# Patient Record
Sex: FEMALE | Race: WHITE | NOT HISPANIC OR LATINO | URBAN - METROPOLITAN AREA
[De-identification: names, ages, dates, MRNs, and addresses within clinical notes are randomized per-mention and may not be internally consistent; named-entity substitution may affect disease eponyms.]

---

## 2017-01-03 ENCOUNTER — AMBULATORY - HEALTHEAST (OUTPATIENT)
Dept: CARDIOLOGY | Facility: CLINIC | Age: 62
End: 2017-01-03

## 2017-01-03 DIAGNOSIS — I10 ESSENTIAL HYPERTENSION: ICD-10-CM

## 2017-01-03 DIAGNOSIS — I49.1 PREMATURE ATRIAL CONTRACTION: ICD-10-CM

## 2017-01-19 ENCOUNTER — OFFICE VISIT - HEALTHEAST (OUTPATIENT)
Dept: CARDIOLOGY | Facility: CLINIC | Age: 62
End: 2017-01-19

## 2017-01-19 DIAGNOSIS — G47.33 OSA ON CPAP: ICD-10-CM

## 2017-01-19 DIAGNOSIS — I10 ESSENTIAL HYPERTENSION WITH GOAL BLOOD PRESSURE LESS THAN 140/90: ICD-10-CM

## 2017-01-19 DIAGNOSIS — I25.118 CORONARY ARTERY DISEASE INVOLVING NATIVE CORONARY ARTERY OF NATIVE HEART WITH OTHER FORM OF ANGINA PECTORIS (H): ICD-10-CM

## 2017-01-19 DIAGNOSIS — R42 DIZZY SPELLS: ICD-10-CM

## 2017-01-19 DIAGNOSIS — R06.09 EXERTIONAL DYSPNEA: ICD-10-CM

## 2017-01-19 ASSESSMENT — MIFFLIN-ST. JEOR: SCORE: 1673.27

## 2017-01-31 ENCOUNTER — HOSPITAL ENCOUNTER (OUTPATIENT)
Dept: NUCLEAR MEDICINE | Facility: HOSPITAL | Age: 62
Discharge: HOME | End: 2017-01-31
Attending: INTERNAL MEDICINE

## 2017-01-31 ENCOUNTER — HOSPITAL ENCOUNTER (OUTPATIENT)
Dept: CARDIOLOGY | Facility: HOSPITAL | Age: 62
Discharge: HOME | End: 2017-01-31
Attending: INTERNAL MEDICINE

## 2017-01-31 DIAGNOSIS — G47.33 OSA ON CPAP: ICD-10-CM

## 2017-01-31 DIAGNOSIS — R42 DIZZY SPELLS: ICD-10-CM

## 2017-01-31 DIAGNOSIS — R06.09 EXERTIONAL DYSPNEA: ICD-10-CM

## 2017-01-31 DIAGNOSIS — I10 ESSENTIAL HYPERTENSION WITH GOAL BLOOD PRESSURE LESS THAN 140/90: ICD-10-CM

## 2017-01-31 DIAGNOSIS — I25.118 CORONARY ARTERY DISEASE INVOLVING NATIVE CORONARY ARTERY OF NATIVE HEART WITH OTHER FORM OF ANGINA PECTORIS (H): ICD-10-CM

## 2017-01-31 DIAGNOSIS — R06.09 OTHER FORMS OF DYSPNEA: ICD-10-CM

## 2017-01-31 LAB
NUC STRESS EJECTION FRACTION: 61 %
STRESS ECHO BASELINE BP: NORMAL M/S
STRESS ECHO BASELINE HR: 68 BPM
STRESS ECHO LAST STRESS BP: NORMAL M/S
STRESS ECHO LAST STRESS HR: 96 BPM

## 2017-01-31 ASSESSMENT — MIFFLIN-ST. JEOR: SCORE: 1673.27

## 2017-02-01 LAB
AORTIC ROOT: 3.1 CM
BSA FOR ECHO PROCEDURE: 2.28 M2
CV ECHO HEIGHT: 63.5 IN
CV ECHO WEIGHT: 256 LBS
DOP CALC LVOT AREA: 2.83 CM2
DOP CALC LVOT DIAMETER: 1.9 CM
DOP CALC LVOT PEAK VEL: 90.2 CM/S
DOP CALC LVOT STROKE VOLUME: 63.2 CM3
DOP CALCLVOT PEAK VEL VTI: 22.3 CM
ECHO EJECTION FRACTION ESTIMATED: 60 %
FRACTIONAL SHORTENING: 40.7 % (ref 28–44)
INTERVENTRICULAR SEPTUM IN END DIASTOLE: 1.6 CM (ref 0.6–0.9)
IVS/PW RATIO: 1.2
LEFT ATRIUM AREA: 17.6 CM2
LEFT ATRIUM LENGTH: 4.9 CM
LEFT ATRIUM SIZE: 4.5 CM
LEFT ATRIUM TO AORTIC ROOT RATIO: 1.45 NO UNITS
LEFT VENTRICLE MASS INDEX: 151.5 G/M2
LEFT VENTRICULAR INTERNAL DIMENSION IN DIASTOLE: 5.4 CM (ref 3.8–5.2)
LEFT VENTRICULAR INTERNAL DIMENSION IN SYSTOLE: 3.2 CM (ref 2.2–3.5)
LEFT VENTRICULAR MASS: 345.3 G
LEFT VENTRICULAR OUTFLOW TRACT MEAN GRADIENT: 2 MMHG
LEFT VENTRICULAR OUTFLOW TRACT MEAN VELOCITY: 66 CM/S
LEFT VENTRICULAR OUTFLOW TRACT PEAK GRADIENT: 3 MMHG
LEFT VENTRICULAR POSTERIOR WALL IN END DIASTOLE: 1.3 CM (ref 0.6–0.9)
LV STROKE VOLUME INDEX: 27.7 ML/M2
MITRAL VALVE E/A RATIO: 1.2
MV AVERAGE E/E' RATIO: 16.4 CM/S
MV DECELERATION TIME: 173 MS
MV E'TISSUE VEL-LAT: 6.24 CM/S
MV E'TISSUE VEL-MED: 6.34 CM/S
MV LATERAL E/E' RATIO: 16.5
MV MEDIAL E/E' RATIO: 16.2
MV PEAK A VELOCITY: 88.3 CM/S
MV PEAK E VELOCITY: 103 CM/S
NUC REST DIASTOLIC VOLUME INDEX: 4096 LBS
NUC REST SYSTOLIC VOLUME INDEX: 63.5 IN
PR MAX PG: 6 MMHG
PR PEAK VELOCITY: 124 CM/S
TRICUSPID REGURGITATION PEAK PRESSURE GRADIENT: 13.5 MMHG
TRICUSPID VALVE PEAK REGURGITANT VELOCITY: 184 CM/S

## 2017-02-03 ENCOUNTER — AMBULATORY - HEALTHEAST (OUTPATIENT)
Dept: CARDIOLOGY | Facility: CLINIC | Age: 62
End: 2017-02-03

## 2017-02-03 DIAGNOSIS — I51.89 DIASTOLIC DYSFUNCTION: ICD-10-CM

## 2017-03-08 ENCOUNTER — RECORDS - HEALTHEAST (OUTPATIENT)
Dept: LAB | Facility: CLINIC | Age: 62
End: 2017-03-08

## 2017-03-09 LAB
CHOLEST SERPL-MCNC: 172 MG/DL
FASTING STATUS PATIENT QL REPORTED: NORMAL
HDLC SERPL-MCNC: 57 MG/DL
LDLC SERPL CALC-MCNC: 86 MG/DL
TRIGL SERPL-MCNC: 147 MG/DL

## 2017-03-13 ENCOUNTER — COMMUNICATION - HEALTHEAST (OUTPATIENT)
Dept: CARDIOLOGY | Facility: CLINIC | Age: 62
End: 2017-03-13

## 2017-03-13 DIAGNOSIS — I10 ESSENTIAL HYPERTENSION WITH GOAL BLOOD PRESSURE LESS THAN 140/90: ICD-10-CM

## 2017-03-13 DIAGNOSIS — I51.89 DIASTOLIC DYSFUNCTION: ICD-10-CM

## 2017-05-17 ENCOUNTER — AMBULATORY - HEALTHEAST (OUTPATIENT)
Dept: CARDIOLOGY | Facility: CLINIC | Age: 62
End: 2017-05-17

## 2017-05-22 ENCOUNTER — AMBULATORY - HEALTHEAST (OUTPATIENT)
Dept: CARDIOLOGY | Facility: CLINIC | Age: 62
End: 2017-05-22

## 2017-05-22 ENCOUNTER — OFFICE VISIT - HEALTHEAST (OUTPATIENT)
Dept: CARDIOLOGY | Facility: CLINIC | Age: 62
End: 2017-05-22

## 2017-05-22 DIAGNOSIS — I10 ESSENTIAL HYPERTENSION: ICD-10-CM

## 2017-05-22 DIAGNOSIS — Z01.810 PREOPERATIVE CARDIOVASCULAR EXAMINATION: ICD-10-CM

## 2017-05-22 DIAGNOSIS — I25.10 CORONARY ARTERY DISEASE INVOLVING NATIVE CORONARY ARTERY OF NATIVE HEART WITHOUT ANGINA PECTORIS: ICD-10-CM

## 2017-05-22 DIAGNOSIS — G47.30 SLEEP APNEA, UNSPECIFIED TYPE: ICD-10-CM

## 2017-05-22 ASSESSMENT — MIFFLIN-ST. JEOR: SCORE: 1668.73

## 2017-11-13 ENCOUNTER — COMMUNICATION - HEALTHEAST (OUTPATIENT)
Dept: CARDIOLOGY | Facility: CLINIC | Age: 62
End: 2017-11-13

## 2017-11-13 DIAGNOSIS — I25.10 CORONARY ARTERY DISEASE INVOLVING NATIVE CORONARY ARTERY OF NATIVE HEART WITHOUT ANGINA PECTORIS: ICD-10-CM

## 2017-12-08 ENCOUNTER — COMMUNICATION - HEALTHEAST (OUTPATIENT)
Dept: CARDIOLOGY | Facility: CLINIC | Age: 62
End: 2017-12-08

## 2017-12-08 DIAGNOSIS — I51.89 DIASTOLIC DYSFUNCTION: ICD-10-CM

## 2018-01-03 ENCOUNTER — COMMUNICATION - HEALTHEAST (OUTPATIENT)
Dept: CARDIOLOGY | Facility: CLINIC | Age: 63
End: 2018-01-03

## 2018-01-03 DIAGNOSIS — I10 ESSENTIAL HYPERTENSION WITH GOAL BLOOD PRESSURE LESS THAN 140/90: ICD-10-CM

## 2018-01-23 ENCOUNTER — RECORDS - HEALTHEAST (OUTPATIENT)
Dept: LAB | Facility: CLINIC | Age: 63
End: 2018-01-23

## 2018-01-23 LAB
ALBUMIN SERPL-MCNC: 3.5 G/DL (ref 3.5–5)
ALP SERPL-CCNC: 82 U/L (ref 45–120)
ALT SERPL W P-5'-P-CCNC: 25 U/L (ref 0–45)
ANION GAP SERPL CALCULATED.3IONS-SCNC: 10 MMOL/L (ref 5–18)
AST SERPL W P-5'-P-CCNC: 25 U/L (ref 0–40)
BILIRUB SERPL-MCNC: 0.3 MG/DL (ref 0–1)
BUN SERPL-MCNC: 10 MG/DL (ref 8–22)
CALCIUM SERPL-MCNC: 9.1 MG/DL (ref 8.5–10.5)
CHLORIDE BLD-SCNC: 103 MMOL/L (ref 98–107)
CHOLEST SERPL-MCNC: 138 MG/DL
CO2 SERPL-SCNC: 26 MMOL/L (ref 22–31)
CREAT SERPL-MCNC: 0.66 MG/DL (ref 0.6–1.1)
FASTING STATUS PATIENT QL REPORTED: ABNORMAL
GFR SERPL CREATININE-BSD FRML MDRD: >60 ML/MIN/1.73M2
GLUCOSE BLD-MCNC: 138 MG/DL (ref 70–125)
HDLC SERPL-MCNC: 47 MG/DL
LDLC SERPL CALC-MCNC: 72 MG/DL
POTASSIUM BLD-SCNC: 4.1 MMOL/L (ref 3.5–5)
PROT SERPL-MCNC: 7 G/DL (ref 6–8)
SODIUM SERPL-SCNC: 139 MMOL/L (ref 136–145)
TRIGL SERPL-MCNC: 95 MG/DL
TSH SERPL DL<=0.005 MIU/L-ACNC: 3.84 UIU/ML (ref 0.3–5)

## 2018-03-14 ENCOUNTER — COMMUNICATION - HEALTHEAST (OUTPATIENT)
Dept: ADMINISTRATIVE | Facility: CLINIC | Age: 63
End: 2018-03-14

## 2018-05-14 ENCOUNTER — COMMUNICATION - HEALTHEAST (OUTPATIENT)
Dept: CARDIOLOGY | Facility: CLINIC | Age: 63
End: 2018-05-14

## 2018-05-14 DIAGNOSIS — I25.10 CORONARY ARTERY DISEASE INVOLVING NATIVE CORONARY ARTERY OF NATIVE HEART WITHOUT ANGINA PECTORIS: ICD-10-CM

## 2018-05-14 RX ORDER — SIMVASTATIN 20 MG
20 TABLET ORAL AT BEDTIME
Qty: 90 TABLET | Refills: 1 | Status: SHIPPED | OUTPATIENT
Start: 2018-05-14

## 2018-06-21 ENCOUNTER — RECORDS - HEALTHEAST (OUTPATIENT)
Dept: LAB | Facility: CLINIC | Age: 63
End: 2018-06-21

## 2018-06-21 LAB
ANION GAP SERPL CALCULATED.3IONS-SCNC: 10 MMOL/L (ref 5–18)
BUN SERPL-MCNC: 18 MG/DL (ref 8–22)
CALCIUM SERPL-MCNC: 9.9 MG/DL (ref 8.5–10.5)
CHLORIDE BLD-SCNC: 101 MMOL/L (ref 98–107)
CHOLEST SERPL-MCNC: 170 MG/DL
CO2 SERPL-SCNC: 27 MMOL/L (ref 22–31)
CREAT SERPL-MCNC: 0.7 MG/DL (ref 0.6–1.1)
FASTING STATUS PATIENT QL REPORTED: ABNORMAL
GFR SERPL CREATININE-BSD FRML MDRD: >60 ML/MIN/1.73M2
GLUCOSE BLD-MCNC: 167 MG/DL (ref 70–125)
HDLC SERPL-MCNC: 55 MG/DL
LDLC SERPL CALC-MCNC: 84 MG/DL
POTASSIUM BLD-SCNC: 4.5 MMOL/L (ref 3.5–5)
SODIUM SERPL-SCNC: 138 MMOL/L (ref 136–145)
TRIGL SERPL-MCNC: 157 MG/DL

## 2018-06-22 ENCOUNTER — AMBULATORY - HEALTHEAST (OUTPATIENT)
Dept: CARDIOLOGY | Facility: CLINIC | Age: 63
End: 2018-06-22

## 2018-06-22 ENCOUNTER — RECORDS - HEALTHEAST (OUTPATIENT)
Dept: ADMINISTRATIVE | Facility: OTHER | Age: 63
End: 2018-06-22

## 2018-06-25 ENCOUNTER — OFFICE VISIT - HEALTHEAST (OUTPATIENT)
Dept: CARDIOLOGY | Facility: CLINIC | Age: 63
End: 2018-06-25

## 2018-06-25 DIAGNOSIS — G47.30 SLEEP APNEA, UNSPECIFIED TYPE: ICD-10-CM

## 2018-06-25 DIAGNOSIS — I10 ESSENTIAL HYPERTENSION: ICD-10-CM

## 2018-06-25 DIAGNOSIS — E11.9 TYPE 2 DIABETES MELLITUS WITHOUT COMPLICATION, WITHOUT LONG-TERM CURRENT USE OF INSULIN (H): ICD-10-CM

## 2018-06-25 DIAGNOSIS — I25.10 CORONARY ARTERY DISEASE INVOLVING NATIVE CORONARY ARTERY OF NATIVE HEART WITHOUT ANGINA PECTORIS: ICD-10-CM

## 2018-06-25 DIAGNOSIS — E78.2 MIXED HYPERLIPIDEMIA: ICD-10-CM

## 2018-06-25 RX ORDER — ROSUVASTATIN CALCIUM 10 MG/1
TABLET, COATED ORAL
Refills: 0 | Status: SHIPPED | COMMUNITY
Start: 2018-06-21

## 2018-06-25 ASSESSMENT — MIFFLIN-ST. JEOR: SCORE: 1668.73

## 2018-07-02 ENCOUNTER — COMMUNICATION - HEALTHEAST (OUTPATIENT)
Dept: CARDIOLOGY | Facility: CLINIC | Age: 63
End: 2018-07-02

## 2018-07-02 DIAGNOSIS — I10 ESSENTIAL HYPERTENSION WITH GOAL BLOOD PRESSURE LESS THAN 140/90: ICD-10-CM

## 2018-09-04 ENCOUNTER — COMMUNICATION - HEALTHEAST (OUTPATIENT)
Dept: CARDIOLOGY | Facility: CLINIC | Age: 63
End: 2018-09-04

## 2018-09-04 DIAGNOSIS — I51.89 DIASTOLIC DYSFUNCTION: ICD-10-CM

## 2018-09-04 RX ORDER — FUROSEMIDE 20 MG
20 TABLET ORAL DAILY
Qty: 90 TABLET | Refills: 2 | Status: SHIPPED | OUTPATIENT
Start: 2018-09-04

## 2019-01-02 ENCOUNTER — COMMUNICATION - HEALTHEAST (OUTPATIENT)
Dept: CARDIOLOGY | Facility: CLINIC | Age: 64
End: 2019-01-02

## 2019-01-02 DIAGNOSIS — I10 ESSENTIAL HYPERTENSION WITH GOAL BLOOD PRESSURE LESS THAN 140/90: ICD-10-CM

## 2019-01-07 ENCOUNTER — RECORDS - HEALTHEAST (OUTPATIENT)
Dept: LAB | Facility: CLINIC | Age: 64
End: 2019-01-07

## 2019-01-07 LAB
ANION GAP SERPL CALCULATED.3IONS-SCNC: 13 MMOL/L (ref 5–18)
BUN SERPL-MCNC: 14 MG/DL (ref 8–22)
CALCIUM SERPL-MCNC: 9.6 MG/DL (ref 8.5–10.5)
CHLORIDE BLD-SCNC: 102 MMOL/L (ref 98–107)
CO2 SERPL-SCNC: 26 MMOL/L (ref 22–31)
CREAT SERPL-MCNC: 0.72 MG/DL (ref 0.6–1.1)
GFR SERPL CREATININE-BSD FRML MDRD: >60 ML/MIN/1.73M2
GLUCOSE BLD-MCNC: 151 MG/DL (ref 70–125)
POTASSIUM BLD-SCNC: 4.2 MMOL/L (ref 3.5–5)
SODIUM SERPL-SCNC: 141 MMOL/L (ref 136–145)
TSH SERPL DL<=0.005 MIU/L-ACNC: 1.96 UIU/ML (ref 0.3–5)

## 2019-03-04 ENCOUNTER — RECORDS - HEALTHEAST (OUTPATIENT)
Dept: LAB | Facility: CLINIC | Age: 64
End: 2019-03-04

## 2019-03-04 LAB
ANION GAP SERPL CALCULATED.3IONS-SCNC: 13 MMOL/L (ref 5–18)
BUN SERPL-MCNC: 10 MG/DL (ref 8–22)
CALCIUM SERPL-MCNC: 9.8 MG/DL (ref 8.5–10.5)
CHLORIDE BLD-SCNC: 104 MMOL/L (ref 98–107)
CO2 SERPL-SCNC: 24 MMOL/L (ref 22–31)
CREAT SERPL-MCNC: 0.64 MG/DL (ref 0.6–1.1)
GFR SERPL CREATININE-BSD FRML MDRD: >60 ML/MIN/1.73M2
GLUCOSE BLD-MCNC: 124 MG/DL (ref 70–125)
HGB BLD-MCNC: 14.2 G/DL (ref 12–16)
POTASSIUM BLD-SCNC: 4.3 MMOL/L (ref 3.5–5)
SODIUM SERPL-SCNC: 141 MMOL/L (ref 136–145)

## 2019-04-16 ENCOUNTER — COMMUNICATION - HEALTHEAST (OUTPATIENT)
Dept: ADMINISTRATIVE | Facility: CLINIC | Age: 64
End: 2019-04-16

## 2019-05-09 ENCOUNTER — COMMUNICATION - HEALTHEAST (OUTPATIENT)
Dept: ADMINISTRATIVE | Facility: CLINIC | Age: 64
End: 2019-05-09

## 2019-06-07 ENCOUNTER — RECORDS - HEALTHEAST (OUTPATIENT)
Dept: LAB | Facility: CLINIC | Age: 64
End: 2019-06-07

## 2019-06-07 LAB
CHOLEST SERPL-MCNC: 133 MG/DL
FASTING STATUS PATIENT QL REPORTED: ABNORMAL
HDLC SERPL-MCNC: 54 MG/DL
LDLC SERPL CALC-MCNC: 47 MG/DL
TRIGL SERPL-MCNC: 158 MG/DL

## 2019-09-11 ENCOUNTER — RECORDS - HEALTHEAST (OUTPATIENT)
Dept: LAB | Facility: CLINIC | Age: 64
End: 2019-09-11

## 2019-09-11 LAB
ANION GAP SERPL CALCULATED.3IONS-SCNC: 10 MMOL/L (ref 5–18)
BUN SERPL-MCNC: 12 MG/DL (ref 8–22)
CALCIUM SERPL-MCNC: 10.3 MG/DL (ref 8.5–10.5)
CHLORIDE BLD-SCNC: 102 MMOL/L (ref 98–107)
CO2 SERPL-SCNC: 28 MMOL/L (ref 22–31)
CREAT SERPL-MCNC: 0.66 MG/DL (ref 0.6–1.1)
GFR SERPL CREATININE-BSD FRML MDRD: >60 ML/MIN/1.73M2
GLUCOSE BLD-MCNC: 96 MG/DL (ref 70–125)
POTASSIUM BLD-SCNC: 4.8 MMOL/L (ref 3.5–5)
SODIUM SERPL-SCNC: 140 MMOL/L (ref 136–145)

## 2019-11-06 ENCOUNTER — RECORDS - HEALTHEAST (OUTPATIENT)
Dept: LAB | Facility: CLINIC | Age: 64
End: 2019-11-06

## 2019-11-06 LAB
ALBUMIN SERPL-MCNC: 3.7 G/DL (ref 3.5–5)
ALP SERPL-CCNC: 72 U/L (ref 45–120)
ALT SERPL W P-5'-P-CCNC: 23 U/L (ref 0–45)
ANION GAP SERPL CALCULATED.3IONS-SCNC: 9 MMOL/L (ref 5–18)
AST SERPL W P-5'-P-CCNC: 18 U/L (ref 0–40)
BILIRUB SERPL-MCNC: 0.5 MG/DL (ref 0–1)
BUN SERPL-MCNC: 13 MG/DL (ref 8–22)
CALCIUM SERPL-MCNC: 9.5 MG/DL (ref 8.5–10.5)
CHLORIDE BLD-SCNC: 103 MMOL/L (ref 98–107)
CO2 SERPL-SCNC: 29 MMOL/L (ref 22–31)
CREAT SERPL-MCNC: 0.7 MG/DL (ref 0.6–1.1)
GFR SERPL CREATININE-BSD FRML MDRD: >60 ML/MIN/1.73M2
GLUCOSE BLD-MCNC: 224 MG/DL (ref 70–125)
POTASSIUM BLD-SCNC: 4.2 MMOL/L (ref 3.5–5)
PROT SERPL-MCNC: 6.8 G/DL (ref 6–8)
SODIUM SERPL-SCNC: 141 MMOL/L (ref 136–145)
TSH SERPL DL<=0.005 MIU/L-ACNC: 0.96 UIU/ML (ref 0.3–5)

## 2019-11-26 ENCOUNTER — COMMUNICATION - HEALTHEAST (OUTPATIENT)
Dept: CARDIOLOGY | Facility: CLINIC | Age: 64
End: 2019-11-26

## 2019-11-26 DIAGNOSIS — I10 ESSENTIAL HYPERTENSION WITH GOAL BLOOD PRESSURE LESS THAN 140/90: ICD-10-CM

## 2019-11-26 RX ORDER — LISINOPRIL 40 MG/1
40 TABLET ORAL DAILY
Qty: 90 TABLET | Refills: 0 | Status: SHIPPED | OUTPATIENT
Start: 2019-11-26

## 2019-12-13 ENCOUNTER — RECORDS - HEALTHEAST (OUTPATIENT)
Dept: ADMINISTRATIVE | Facility: OTHER | Age: 64
End: 2019-12-13

## 2020-01-10 ENCOUNTER — AMBULATORY - HEALTHEAST (OUTPATIENT)
Dept: CARDIOLOGY | Facility: CLINIC | Age: 65
End: 2020-01-10

## 2020-01-10 ENCOUNTER — RECORDS - HEALTHEAST (OUTPATIENT)
Dept: ADMINISTRATIVE | Facility: OTHER | Age: 65
End: 2020-01-10

## 2021-05-25 ENCOUNTER — RECORDS - HEALTHEAST (OUTPATIENT)
Dept: ADMINISTRATIVE | Facility: CLINIC | Age: 66
End: 2021-05-25

## 2021-05-26 ENCOUNTER — RECORDS - HEALTHEAST (OUTPATIENT)
Dept: ADMINISTRATIVE | Facility: CLINIC | Age: 66
End: 2021-05-26

## 2021-05-28 ENCOUNTER — RECORDS - HEALTHEAST (OUTPATIENT)
Dept: ADMINISTRATIVE | Facility: CLINIC | Age: 66
End: 2021-05-28

## 2021-05-30 VITALS — HEIGHT: 64 IN | WEIGHT: 256 LBS | BODY MASS INDEX: 43.71 KG/M2

## 2021-05-31 ENCOUNTER — RECORDS - HEALTHEAST (OUTPATIENT)
Dept: ADMINISTRATIVE | Facility: CLINIC | Age: 66
End: 2021-05-31

## 2021-05-31 VITALS — BODY MASS INDEX: 43.54 KG/M2 | HEIGHT: 64 IN | WEIGHT: 255 LBS

## 2021-06-01 ENCOUNTER — RECORDS - HEALTHEAST (OUTPATIENT)
Dept: ADMINISTRATIVE | Facility: CLINIC | Age: 66
End: 2021-06-01

## 2021-06-01 VITALS — HEIGHT: 64 IN | BODY MASS INDEX: 43.54 KG/M2 | WEIGHT: 255 LBS

## 2021-06-08 NOTE — PROGRESS NOTES
rom: Guevara Fuentes      Sent: 2/1/2017  10:55 AM       To: Kelly Veras RN    Please inform patient that her echocardiogram demonstrated improvement in her left ventricular function and resolution of the wall motion abnormality.  Echocardiogram did demonstrate moderate LVH and also signs of diastolic dysfunction.  This is likely resulting in increased pressures in her heart resulting in dyspnea on exertion.  I would recommend that we start Lasix 20 mg daily to improve pressures in her heart and will need to consider increasing that dose in the future.  Please also inform the patient that her stress test did not demonstrate any signs of ischemia.  Thanks-David.   I will follow-up with patient in 3 months.  She should call the clinic if her symptoms do not improve with the use of Lasix.

## 2021-06-09 ENCOUNTER — RECORDS - HEALTHEAST (OUTPATIENT)
Dept: ADMINISTRATIVE | Facility: CLINIC | Age: 66
End: 2021-06-09

## 2021-06-16 PROBLEM — E11.9 TYPE 2 DIABETES MELLITUS WITHOUT COMPLICATION, WITHOUT LONG-TERM CURRENT USE OF INSULIN (H): Status: ACTIVE | Noted: 2018-06-25

## 2021-06-19 NOTE — LETTER
Letter by Guevara Fuentes DO at      Author: Guevara Fuentes DO Service: -- Author Type: --    Filed:  Encounter Date: 5/9/2019 Status: (Other)         Nina Suarez  [address] [facility] [address]      May 9, 2019      Dear Nina,    This letter is to remind you that you will be due for your follow up appointment with Dr. Guevara Fuentes  . To help ensure you are in the best health possible, a regular follow-up with your cardiologist is essential.     Please call our Patient Scheduling Line at [phone] to schedule your appointment at your earliest convenience.  If you have recently scheduled an appointment, please disregard this letter.    We look forward to seeing you again. As always, we are available at the number  above for any questions or concerns you may have.      Sincerely,     The Physicians and Staff of [facility]

## 2021-06-19 NOTE — LETTER
Letter by Guevara Fuentes DO at      Author: Guevara Fuentes DO Service: -- Author Type: --    Filed:  Encounter Date: 4/16/2019 Status: (Other)         Nina Suarez  [address] [facility] [address]      April 16, 2019      Dear Nina,    This letter is to remind you that you will be due for your follow up appointment with Dr. Guevara Fuentes. To help ensure you are in the best health possible, a regular follow-up with your cardiologist is essential.     Please call our Patient Scheduling Line at [phone] to schedule your appointment at your earliest convenience.  If you have recently scheduled an appointment, please disregard this letter.    We look forward to seeing you again. As always, we are available at the number  above for any questions or concerns you may have.      Sincerely,     The Physicians and Staff of [facility]

## 2021-06-25 NOTE — PROGRESS NOTES
Progress Notes by Guevara Fuentes DO at 5/22/2017  3:10 PM     Author: Guevara Fuentes DO Service: -- Author Type: Physician    Filed: 5/22/2017  3:35 PM Encounter Date: 5/22/2017 Status: Signed    : Guevara Fuentes DO (Physician)           Click to link to [facility]     [facility] HEART CARE NOTE      Assessment/Recommendations   Assessment:    1.  Coronary artery disease status post drug-eluting stent to proximal left anterior descending in April 2005 (3.0mm Taxus stent). No active angina.  No ischemia on recent stress testing in January 2017.  LVEF: 60%  2.  Hyperlipidemia  3.  Hypertension, controlled.   4.  Obstructive sleep apnea on CPAP    Plan:  1.  Continue simvastatin 20 mg daily  (myalgias with 40 mg daily dose)  2.  Continue lisinopril to 40 mg daily.    3.  ASA 81 mg daily (lifelong)   4.  Continue metoprolol  mg daily       Preoperative Cardiac Risk: Low to moderate cardiovascular risk for orthopedic surgery  Assessment of preoperative cardiac risk: She has no active cardiac conditions (unstable coronary syndromes, decompensated HF, significant arrhythmias, or severe valvular disease), has known coronary artery disease, has 2 clinical risk factors (ischemic heart disease, prior heart failure, cerebrovascular disease, diabetes mellitus, and renal insufficiency), and has a functional capacity > than 4 METs.     The scheduled surgery has 0.15% risk for perioperative myocardial infarction or cardiac arrest based on data from Portillo et al. (Circulation 2011; 124: 381-387) and has 6.6% (moderate) risk for major cardiac complications (myocardial infarction, pulmonary embolism, ventricular fibrillation or primary cardiac arrest, and complete heart block) based on data from Misbah et al. (Circulation 199; 100: 8828-1202). She should proceed with operation based on the  ACCF/AHA guidelines. No further cardiac testing and interventions are indicated at this time.   Would recommend continuation of aspirin 81 mg daily.  Will also recommend continuation of metoprolol and simvastatin perioperatively.           History of Present Illness    Ms. Nina Suarez is a 62 y.o. female with history of coronary artery disease with myocardial infarction in 2005,  drug-eluting stent (Taxus 3.0 mm) in proximal left anterior descending, hypertension, hyperlipidemia, obstructive sleep apnea who presents in cardiology clinic in follow-up for her hypertension and coronary disease.      Currently patient is without significant cardiac symptoms.  She recently underwent cardiac stress testing in January 2017 which was negative for inducible ischemia with a normal left ventricular ejection fraction.  She also underwent cardiac monitoring for palpitations which did not demonstrate significant cardiac arrhythmia.  Echocardiogram recently performed in January 2017 demonstrated no significant valvular heart disease.  The patient has normal renal function.  She is not on insulin.      Is currently compliant with her CPAP therapy.    Patient denies recent anginal chest pain, palpitations, syncope, edema, orthopnea, PND or cough.  Patient denies any active or recent bleeding issues or disorders. Mild exertional dyspnea (chronic).        ECHO:  1/31/2017    Left ventricle ejection fraction is normal. The estimated left ventricular ejection fraction is 60%. No regional wall motion abnormality.    No hemodynamically significant valvular heart abnormalities.    When compared to the previous study dated 3/29/2005, there is marked improvement in left ventricular performance. Previous wall motion abnormalities are no longer visualized    Coronary Angiogram: 2005:   85-90% proximal left anterior descending stenosis.  Normal left circumflex artery.  Normal right coronary artery.  3.0 Taxus stent was deployed in the proximal LAD with a 25% residual ostial stenosis in the left anterior descending artery.     NM STRESS  TESTIN2017    1.The exercise nuclear stress test is negative for inducible myocardial ischemia or infarction.    2.The left ventricular ejection fraction is 61%.    3.Hypertension noted at stress and rest.    4.When compared to the images of the study of 2012, there has been no significant change    Ambulatory cardiac monitor: 2016:   IMPRESSION:  1.  A multiday symptom triggered monitor was done from 2016 through  2016.  2.  Baseline transmission showed sinus rhythm with normal electrocardiographic  intervals.  3.  Eight other transmissions were received.  During these times symptoms of  palpitations, fluttering in chest would correlate to a single premature ventricular  complex.  On occasion symptoms were noted without arrhythmias.  4.  No atrial arrhythmias identified.  No bradycardia is seen.     DISCUSSION:  A few single PVCs are noted as flutters or palpitations.  No complex  arrhythmias identified.  During each symptom event there was a single PVC.          Physical Examination Review of Systems   Vitals:    17 1502   BP: 104/70   Pulse: 64   Resp: 16     Body mass index is 44.46 kg/(m^2).  Wt Readings from Last 3 Encounters:   17 (!) 256 lb (116.1 kg)   17 (!) 256 lb (116.1 kg)   16 (!) 253 lb (114.8 kg)       General Appearance:   no distress, obese body habitus   ENT/Mouth: membranes moist, no oral lesions or bleeding gums.      EYES:  no scleral icterus, normal conjunctivae   Neck: no carotid bruits or thyromegaly   Chest/Lungs:   lungs are clear to auscultation, no rales or wheezing, no sternal scar, equal chest wall expansion    Cardiovascular:   Regular. Normal first and second heart sounds with faint systolic murmur. No rubs, or gallops; the carotid, radial and posterior tibial pulses are intact, Jugular venous pressure normal, no pitting edema bilaterally (improved)     Abdomen:  no organomegaly, masses, bruits, or tenderness; bowel sounds are  present   Extremities: no cyanosis or clubbing   Skin: no xanthelasma, warm.    Neurologic: normal gait, normal  bilateral, no tremors     Psychiatric: alert and oriented x3, calm     General: WNL  Eyes: WNL  Ears/Nose/Throat: WNL  Lungs: Cough  Heart: WNL  Stomach: WNL  Bladder: WNL  Muscle/Joints: Joint Pain, Muscle Weakness, Muscle Pain  Skin: WNL  Nervous System: WNL  Mental Health: WNL     Blood: WNL       Medical History  Surgical History Family History Social History   Past Medical History:   Diagnosis Date   ? Anesthesia complication     depression postop   ? Anxiety    ? Coronary artery disease     1 stent in LAD in 2005   ? Disease of thyroid gland     hypothyroidism   ? Family history of myocardial infarction    ? GERD (gastroesophageal reflux disease)    ? Hard to intubate     pt had hip surgery at [facility]  May 2010 and received letter from anesthesia department  that her intubation was difficult   ? High cholesterol    ? Hyperlipidemia    ? Hypertension    ? Myocardial infarction 2005   ? Sleep apnea     uses CPAP   ? Sleep apnea, obstructive     Past Surgical History:   Procedure Laterality Date   ? COMBINED HYSTEROSCOPY DIAGNOSTIC / D&C N/A 6/26/2014    Procedure: DILATION AND CURETTAGE WITH HYSTEROSCOPY, POLYPECTOMY;  Surgeon: Stephanie Burrows MD;  Location: [facility] [place];  Service:    ? CORONARY STENT PLACEMENT  2005    1 stent in LAD   ? REVISION TOTAL HIP ARTHROPLASTY  May 19,2010   ? TONSILLECTOMY  at age 3    Family History   Problem Relation Age of Onset   ? Breast cancer Mother    ? Heart attack Father 75   ? No Medical Problems Sister    ? No Medical Problems Daughter    ? Breast cancer Maternal Grandmother    ? No Medical Problems Maternal Grandfather    ? Cancer Paternal Grandmother    ? No Medical Problems Paternal Grandfather    ? No Medical Problems Maternal Aunt    ? No Medical Problems Paternal Aunt    ? BRCA 1/2 Neg Hx    ? Colon cancer Neg Hx    ? Endometrial  cancer Neg Hx    ? Ovarian cancer Neg Hx     Social History     Social History   ? Marital status:      Spouse name: N/A   ? Number of children: N/A   ? Years of education: N/A     Occupational History   ? Not on file.     Social History Main Topics   ? Smoking status: Former Smoker     Years: 4.00     Quit date: 6/25/1977   ? Smokeless tobacco: Not on file   ? Alcohol use No   ? Drug use: No   ? Sexual activity: Not on file     Other Topics Concern   ? Not on file     Social History Narrative          Medications  Allergies   Current Outpatient Prescriptions   Medication Sig Dispense Refill   ? aspirin 81 MG EC tablet Take 81 mg by mouth daily.     ? furosemide (LASIX) 20 MG tablet Take 1 tablet (20 mg total) by mouth daily. 90 tablet 2   ? levothyroxine (SYNTHROID, LEVOTHROID) 100 MCG tablet Take 100 mcg by mouth daily.     ? lisinopril (PRINIVIL,ZESTRIL) 40 MG tablet Take 1 tablet (40 mg total) by mouth daily. 90 tablet 3   ? metoprolol (TOPROL-XL) 100 MG 24 hr tablet Take 100 mg by mouth bedtime.     ? nitroglycerin (NITROSTAT) 0.4 MG SL tablet Place 0.4 mg under the tongue every 5 (five) minutes as needed for chest pain.     ? omeprazole (PRILOSEC) 20 MG capsule Take 20 mg by mouth daily.     ? simvastatin (ZOCOR) 20 MG tablet Take 1 tablet (20 mg total) by mouth bedtime. 90 tablet 3   ? venlafaxine (EFFEXOR-XR) 75 MG 24 hr capsule Take 75 mg by mouth daily. Takes in the late morning       No current facility-administered medications for this visit.       Allergies   Allergen Reactions   ? Chlorthalidone      Fatigue, loss of energy   ? Erythromycin      GI upset   ? Lipitor [Atorvastatin] Myalgia   ? Procaine      Heart palpitations   ? Propoxyphene      Dizziness, lightheaded   ? Sulfa (Sulfonamide Antibiotics)      GI upset   ? Sulfamethoxazole-Trimethoprim      GI upset   ? Tetracycline      GI upset         Lab Results    Chemistry/lipid CBC Cardiac Enzymes/BNP/TSH/INR   Lab Results   Component  Value Date    CHOL 172 03/08/2017    HDL 57 03/08/2017    LDLCALC 86 03/08/2017    TRIG 147 03/08/2017    CREATININE 0.68 03/08/2017    BUN 14 03/08/2017    K 4.5 03/08/2017     03/08/2017     03/08/2017    CO2 25 03/08/2017    Lab Results   Component Value Date    WBC 6.6 06/28/2016    HGB 13.8 06/28/2016    HCT 42.0 06/28/2016    MCV 94 06/28/2016     06/28/2016    Lab Results   Component Value Date    CKTOTAL 306 (H) 06/28/2016    TSH 3.91 11/10/2016

## 2021-06-25 NOTE — PROGRESS NOTES
Progress Notes by Guevara Fuentes DO at 1/19/2017  3:10 PM     Author: Guevara Fuentes DO Service: -- Author Type: Physician    Filed: 1/19/2017  5:15 PM Encounter Date: 1/19/2017 Status: Signed    : Guevara Fuentes DO (Physician)           Click to link to [facility]     [facility] HEART CARE NOTE    Thank you, Dr. Velarde, for asking the [facility] team to see Ms. Nina Suarez to evaluate palpitations and hypertension.      Assessment/Recommendations   Assessment:    1.  Rapid heart palpitations (resolved).  Cardiac monitor demonstrated the symptoms related to premature ventricular contractions.    2.  Coronary artery disease status post drug-eluting stent to proximal left anterior descending in April 2005 (3.0mm Taxus stent).   3.  Hyperlipidemia  4.  Hypertension, poorly controlled  5.  Obstructive sleep apnea    Plan:  1.  NM Stress (exercise), convert Lexiscan if unable to meet target heart rate.    2.  Transthoracic echocardiogram given worsening exertional dyspnea.    3.  Continue simvastatin 20 mg daily given myalgias with 40 mg daily  4.  Increase lisinopril to 40 mg daily.    5.  ASA 81 mg daily (lifelong)   6.  Continue metoprolol    Follow-up in 3 months       History of Present Illness    Ms. Nina Suarez is a 62 y.o. female with history of coronary artery disease with myocardial infarction in 2005,  drug-eluting stent (Taxus 3.0 mm) in proximal left anterior descending (25% ostial disease), hypertension, hyperlipidemia, obstructive sleep apnea who presents in cardiology clinic in follow-up for her hypertension and coronary disease.      Initial consultation patient noted significant palpitations which have resolved with adjusting her blood pressure regimen. Currently her blood pressure remains elevated including home monitoring and office monitoring.  She also notes worsening dyspnea on exertion and intermittent episodes of diaphoresis  particularly at night.   Patient states currently dyspnea is pretty significant with exertion and she is unable to ambulate 1 set of stairs without having to rest.      ECHO:  Prior   Left ventricular ejection fraction of 50-55% anteroseptal wall motion abnormality.  No significant valvular abnormalities.      Coronary Angiogram: 2005:   85-90% proximal left anterior descending stenosis.  Normal left circumflex artery.  Normal right coronary artery.  3.0 Taxus stent was deployed in the proximal LAD with a 25% residual ostial stenosis in the left anterior descending artery.     NM STRESS TESTIN  Normal perfusion.  No infarct.  Ejection fraction 70%.  Test negative for ischemia       Physical Examination Review of Systems   Vitals:    17 1507   BP: (!) 172/98   Pulse: 62   Resp: 20   SpO2: 98%     Body mass index is 44.64 kg/(m^2).  Wt Readings from Last 3 Encounters:   17 (!) 256 lb (116.1 kg)   16 (!) 253 lb (114.8 kg)   14 (!) 266 lb 5.1 oz (120.8 kg)       General Appearance:   no distress, obese body habitus   ENT/Mouth: membranes moist, no oral lesions or bleeding gums.      EYES:  no scleral icterus, normal conjunctivae   Neck: no carotid bruits or thyromegaly   Chest/Lungs:   lungs are clear to auscultation, no rales or wheezing, no sternal scar, equal chest wall expansion    Cardiovascular:   Regular. Normal first and second heart sounds with no murmurs, rubs, or gallops; the carotid, radial and posterior tibial pulses are intact, Jugular venous pressure normal, trace edema bilaterally    Abdomen:  no organomegaly, masses, bruits, or tenderness; bowel sounds are present   Extremities: no cyanosis or clubbing   Skin: no xanthelasma, warm.    Neurologic: normal gait, normal  bilateral, no tremors     Psychiatric: alert and oriented x3, calm     General: Night Sweats  Eyes: WNL  Ears/Nose/Throat: WNL  Lungs: WNL  Heart: WNL, + exertional dyspnea.   Stomach: WNL  Bladder:  WNL  Muscle/Joints: WNL  Skin: WNL  Nervous System: Loss of Balance  Mental Health: WNL     Blood: WNL     Medical History  Surgical History Family History Social History   Past Medical History   Diagnosis Date   ? Anesthesia complication      depression postop   ? Anxiety    ? Coronary artery disease      1 stent in LAD in 2005   ? Disease of thyroid gland      hypothyroidism   ? GERD (gastroesophageal reflux disease)    ? Hard to intubate      pt had hip surgery at [facility]  May 2010 and received letter from anesthesia department  that her intubation was difficult   ? Hyperlipidemia    ? Hypertension    ? Myocardial infarction 2005   ? Sleep apnea      uses CPAP    Past Surgical History   Procedure Laterality Date   ? Tonsillectomy  at age 3   ? Coronary stent placement  2005     1 stent in LAD   ? Revision total hip arthroplasty  May 19,2010   ? Combined hysteroscopy diagnostic / d&c N/A 6/26/2014     Procedure: DILATION AND CURETTAGE WITH HYSTEROSCOPY, POLYPECTOMY;  Surgeon: Stephanie Burrows MD;  Location: [facility];  Service:     Family History   Problem Relation Age of Onset   ? Breast cancer Mother    ? No Medical Problems Father    ? No Medical Problems Sister    ? No Medical Problems Daughter    ? Breast cancer Maternal Grandmother    ? No Medical Problems Maternal Grandfather    ? Cancer Paternal Grandmother    ? No Medical Problems Paternal Grandfather    ? No Medical Problems Maternal Aunt    ? No Medical Problems Paternal Aunt    ? BRCA 1/2 Neg Hx    ? Colon cancer Neg Hx    ? Endometrial cancer Neg Hx    ? Ovarian cancer Neg Hx     Social History     Social History   ? Marital status:      Spouse name: N/A   ? Number of children: N/A   ? Years of education: N/A     Occupational History   ? Not on file.     Social History Main Topics   ? Smoking status: Former Smoker     Years: 4.00     Quit date: 6/25/1977   ? Smokeless tobacco: Not on file   ? Alcohol use No   ? Drug use:  No   ? Sexual activity: Not on file     Other Topics Concern   ? Not on file     Social History Narrative          Medications  Allergies   Current Outpatient Prescriptions   Medication Sig Dispense Refill   ? aspirin 81 MG EC tablet Take 81 mg by mouth daily.     ? levothyroxine (SYNTHROID, LEVOTHROID) 100 MCG tablet Take 100 mcg by mouth daily.     ? lisinopril (PRINIVIL,ZESTRIL) 40 MG tablet Take 1 tablet (40 mg total) by mouth daily. 90 tablet 3   ? metoprolol (TOPROL-XL) 100 MG 24 hr tablet Take 100 mg by mouth bedtime.     ? nitroglycerin (NITROSTAT) 0.4 MG SL tablet Place 0.4 mg under the tongue every 5 (five) minutes as needed for chest pain.     ? omeprazole (PRILOSEC) 20 MG capsule Take 20 mg by mouth daily.     ? simvastatin (ZOCOR) 20 MG tablet Take 1 tablet (20 mg total) by mouth bedtime. 90 tablet 3   ? venlafaxine (EFFEXOR-XR) 75 MG 24 hr capsule Take 75 mg by mouth daily. Takes in the late morning       No current facility-administered medications for this visit.       Allergies   Allergen Reactions   ? Chlorthalidone      Fatigue, loss of energy   ? Erythromycin      GI upset   ? Lipitor [Atorvastatin] Myalgia   ? Procaine      Heart palpitations   ? Propoxyphene      Dizziness, lightheaded   ? Sulfa (Sulfonamide Antibiotics)      GI upset   ? Sulfamethoxazole-Trimethoprim      GI upset   ? Tetracycline      GI upset         Lab Results    Chemistry/lipid CBC Cardiac Enzymes/BNP/TSH/INR   Lab Results   Component Value Date    CHOL 156 04/27/2016    HDL 54 04/27/2016    LDLCALC 74 04/27/2016    TRIG 139 04/27/2016    CREATININE 0.64 01/03/2017    BUN 11 01/03/2017    K 4.3 01/03/2017     01/03/2017     01/03/2017    CO2 24 01/03/2017    Lab Results   Component Value Date    WBC 6.6 06/28/2016    HGB 13.8 06/28/2016    HCT 42.0 06/28/2016    MCV 94 06/28/2016     06/28/2016    Lab Results   Component Value Date    CKTOTAL 306 (H) 06/28/2016    TSH 3.91 11/10/2016

## 2021-06-26 NOTE — PROGRESS NOTES
Progress Notes by Guevara Fuentes DO at 6/25/2018  4:30 PM     Author: Guevara Fuentes DO Service: -- Author Type: Physician    Filed: 6/25/2018  5:09 PM Encounter Date: 6/25/2018 Status: Signed    : Guevara Fuentes DO (Physician)           Click to link to [facility]     [facility] HEART CARE NOTE      Assessment/Recommendations   Assessment:    1.  Premature coronary artery disease status post drug-eluting stent to proximal left anterior descending in April 2005 (3.0mm Taxus stent).  No anginal symptoms.   2.  Hyperlipidemia. LDL 84 (goal <70).    3.  Hypertension, controlled today.   4.  Obstructive sleep apnea on CPAP  5.  Type II DM, non-insulin dependent.       Plan:  1.  Continue Crestor 10 mg daily, repeat FASTING lipid at 3 months and consider increase further if no symptoms. Goal LDL:<70   2.  Continue lisinopril to 40 mg daily.    3.  ASA 81 mg daily (lifelong)   4.  Continue metoprolol  mg daily  5.  Increase exercise as tolerated.  Currently in PT for right hip issues post surgery.        History of Present Illness    Ms. Nina Suarez is a 63 y.o. female with history of coronary artery disease with myocardial infarction in 2005,  drug-eluting stent (Taxus 3.0 mm) in proximal left anterior descending, hypertension, hyperlipidemia, obstructive sleep apnea, DM type II who presents in cardiology clinic in follow-up for coronary disease, hypertension and hyperlipidemia.    Since last evaluation approximately 1 year ago the patient was diagnosed with diabetes mellitus and started on metformin.  More recently she has had worsening muscle aches and pains and her simvastatin was changed to Crestor 10 mg daily.  Currently she denies any anginal chest pain, dyspnea on exertion, PND/orthopnea symptoms, palpitations or syncope.    Is compliant with her current medications.  She does use her CPAP daily.  Had no acute complications related to orthopedic surgery last  year.    ECHO:  2017    Left ventricle ejection fraction is normal. The estimated left ventricular ejection fraction is 60%. No regional wall motion abnormality.    No hemodynamically significant valvular heart abnormalities.    When compared to the previous study dated 3/29/2005, there is marked improvement in left ventricular performance. Previous wall motion abnormalities are no longer visualized    Coronary Angiogram: :   85-90% proximal left anterior descending stenosis.  Normal left circumflex artery.  Normal right coronary artery.  3.0 Taxus stent was deployed in the proximal LAD with a 25% residual ostial stenosis in the left anterior descending artery.     NM STRESS TESTIN2017    1.The exercise nuclear stress test is negative for inducible myocardial ischemia or infarction.    2.The left ventricular ejection fraction is 61%.    3.Hypertension noted at stress and rest.    4.When compared to the images of the study of 2012, there has been no significant change         Physical Examination Review of Systems   Vitals:    18 1635   BP: 132/74   Pulse: 74   Resp: 16     Body mass index is 44.46 kg/(m^2).  Wt Readings from Last 3 Encounters:   18 (!) 255 lb (115.7 kg)   17 (!) 255 lb (115.7 kg)   17 (!) 256 lb (116.1 kg)       General Appearance:   no distress, obese body habitus   ENT/Mouth: membranes moist, no oral lesions or bleeding gums.      EYES:  no scleral icterus, normal conjunctivae   Neck: no carotid bruits or thyromegaly   Chest/Lungs:   lungs are clear to auscultation, no rales.    Cardiovascular:   Regular. Normal first and second heart sounds with faint systolic murmur. Jugular venous pressure normal, trace pitting edema bilaterally.    Abdomen:  No tenderness; bowel sounds are present   Extremities: no cyanosis or clubbing   Skin: no xanthelasma, warm.    Neurologic: normal gait, normal  bilateral, no tremors     Psychiatric: alert and oriented x3,  calm     General: WNL  Eyes: WNL  Ears/Nose/Throat: WNL  Lungs: WNL  Heart: Leg Swelling  Stomach: WNL  Bladder: WNL  Muscle/Joints: Muscle Weakness, Muscle Pain  Skin: Rash  Nervous System: WNL  Mental Health: WNL     Blood: WNL       Medical History  Surgical History Family History Social History   Past Medical History:   Diagnosis Date   ? Anesthesia complication     depression postop   ? Anxiety    ? Coronary artery disease     1 stent in LAD in 2005   ? Disease of thyroid gland     hypothyroidism   ? Family history of myocardial infarction    ? GERD (gastroesophageal reflux disease)    ? Hard to intubate     pt had hip surgery at [facility]  May 2010 and received letter from anesthesia department  that her intubation was difficult   ? High cholesterol    ? Hyperlipidemia    ? Hypertension    ? Myocardial infarction 2005   ? Sleep apnea     uses CPAP   ? Sleep apnea, obstructive     Past Surgical History:   Procedure Laterality Date   ? COMBINED HYSTEROSCOPY DIAGNOSTIC / D&C N/A 6/26/2014    Procedure: DILATION AND CURETTAGE WITH HYSTEROSCOPY, POLYPECTOMY;  Surgeon: Stephanie Burrows MD;  Location: [facility];  Service:    ? CORONARY STENT PLACEMENT  2005    1 stent in LAD   ? REVISION TOTAL HIP ARTHROPLASTY  May 19,2010   ? TONSILLECTOMY  at age 3    Family History   Problem Relation Age of Onset   ? Breast cancer Mother    ? Heart attack Father 75   ? No Medical Problems Sister    ? No Medical Problems Daughter    ? Breast cancer Maternal Grandmother    ? No Medical Problems Maternal Grandfather    ? Cancer Paternal Grandmother    ? No Medical Problems Paternal Grandfather    ? No Medical Problems Maternal Aunt    ? No Medical Problems Paternal Aunt    ? BRCA 1/2 Neg Hx    ? Colon cancer Neg Hx    ? Endometrial cancer Neg Hx    ? Ovarian cancer Neg Hx     Social History     Social History   ? Marital status:      Spouse name: N/A   ? Number of children: N/A   ? Years of education: N/A      Occupational History   ? Not on file.     Social History Main Topics   ? Smoking status: Former Smoker     Years: 4.00     Quit date: 6/25/1977   ? Smokeless tobacco: Never Used   ? Alcohol use No   ? Drug use: No   ? Sexual activity: Not on file     Other Topics Concern   ? Not on file     Social History Narrative          Medications  Allergies   Current Outpatient Prescriptions   Medication Sig Dispense Refill   ? aspirin 81 MG EC tablet Take 81 mg by mouth daily.     ? DOCOSAHEXANOIC ACID/EPA (FISH OIL ORAL) Take 1 capsule by mouth daily.     ? furosemide (LASIX) 20 MG tablet Take 1 tablet (20 mg total) by mouth daily. 90 tablet 2   ? levothyroxine (SYNTHROID, LEVOTHROID) 100 MCG tablet Take 100 mcg by mouth daily.     ? lisinopril (PRINIVIL,ZESTRIL) 40 MG tablet Take 1 tablet (40 mg total) by mouth daily. 90 tablet 1   ? metFORMIN (GLUCOPHAGE) 500 MG tablet Take 500 mg by mouth daily.     ? metoprolol (TOPROL-XL) 100 MG 24 hr tablet Take 100 mg by mouth bedtime.     ? MULTIVIT-MINERALS/FERROUS FUM (MULTI VITAMIN ORAL) Take 1 tablet by mouth daily.     ? nitroglycerin (NITROSTAT) 0.4 MG SL tablet Place 0.4 mg under the tongue every 5 (five) minutes as needed for chest pain.     ? omeprazole (PRILOSEC) 20 MG capsule Take 20 mg by mouth daily.     ? rosuvastatin (CRESTOR) 10 MG tablet TK 1 T PO QD HS  0   ? venlafaxine (EFFEXOR-XR) 75 MG 24 hr capsule Take 75 mg by mouth daily. Takes in the late morning     ? simvastatin (ZOCOR) 20 MG tablet Take 1 tablet (20 mg total) by mouth at bedtime. 90 tablet 1     No current facility-administered medications for this visit.       Allergies   Allergen Reactions   ? Chlorthalidone      Fatigue, loss of energy   ? Erythromycin      GI upset   ? Lipitor [Atorvastatin] Myalgia   ? Procaine      Heart palpitations   ? Propoxyphene      Dizziness, lightheaded   ? Sulfa (Sulfonamide Antibiotics)      GI upset   ? Sulfamethoxazole-Trimethoprim      GI upset   ? Tetracycline       GI upset         Lab Results    Chemistry/lipid CBC Cardiac Enzymes/BNP/TSH/INR   Lab Results   Component Value Date    CHOL 170 06/21/2018    HDL 55 06/21/2018    LDLCALC 84 06/21/2018    TRIG 157 (H) 06/21/2018    CREATININE 0.70 06/21/2018    BUN 18 06/21/2018    K 4.5 06/21/2018     06/21/2018     06/21/2018    CO2 27 06/21/2018    Lab Results   Component Value Date    WBC 6.6 06/28/2016    HGB 13.8 06/28/2016    HCT 42.0 06/28/2016    MCV 94 06/28/2016     06/28/2016    Lab Results   Component Value Date    CKTOTAL 306 (H) 06/28/2016    TSH 3.84 01/23/2018

## 2021-06-27 ENCOUNTER — HEALTH MAINTENANCE LETTER (OUTPATIENT)
Age: 66
End: 2021-06-27

## 2021-07-13 ENCOUNTER — RECORDS - HEALTHEAST (OUTPATIENT)
Dept: ADMINISTRATIVE | Facility: CLINIC | Age: 66
End: 2021-07-13

## 2021-07-21 ENCOUNTER — RECORDS - HEALTHEAST (OUTPATIENT)
Dept: ADMINISTRATIVE | Facility: CLINIC | Age: 66
End: 2021-07-21

## 2021-10-17 ENCOUNTER — HEALTH MAINTENANCE LETTER (OUTPATIENT)
Age: 66
End: 2021-10-17

## 2022-02-06 ENCOUNTER — HEALTH MAINTENANCE LETTER (OUTPATIENT)
Age: 67
End: 2022-02-06

## 2022-07-24 ENCOUNTER — HEALTH MAINTENANCE LETTER (OUTPATIENT)
Age: 67
End: 2022-07-24

## 2022-10-02 ENCOUNTER — HEALTH MAINTENANCE LETTER (OUTPATIENT)
Age: 67
End: 2022-10-02

## 2023-05-20 ENCOUNTER — HEALTH MAINTENANCE LETTER (OUTPATIENT)
Age: 68
End: 2023-05-20

## 2023-08-12 ENCOUNTER — HEALTH MAINTENANCE LETTER (OUTPATIENT)
Age: 68
End: 2023-08-12

## 2023-12-30 ENCOUNTER — HEALTH MAINTENANCE LETTER (OUTPATIENT)
Age: 68
End: 2023-12-30